# Patient Record
Sex: FEMALE | Race: WHITE | URBAN - METROPOLITAN AREA
[De-identification: names, ages, dates, MRNs, and addresses within clinical notes are randomized per-mention and may not be internally consistent; named-entity substitution may affect disease eponyms.]

---

## 2018-01-26 ENCOUNTER — EMERGENCY (EMERGENCY)
Facility: HOSPITAL | Age: 28
LOS: 1 days | End: 2018-01-26
Attending: INTERNAL MEDICINE
Payer: COMMERCIAL

## 2018-01-26 VITALS
HEIGHT: 69 IN | OXYGEN SATURATION: 97 % | TEMPERATURE: 98 F | SYSTOLIC BLOOD PRESSURE: 112 MMHG | RESPIRATION RATE: 16 BRPM | HEART RATE: 103 BPM | WEIGHT: 214.95 LBS | DIASTOLIC BLOOD PRESSURE: 65 MMHG

## 2018-01-26 PROCEDURE — 99284 EMERGENCY DEPT VISIT MOD MDM: CPT | Mod: 25

## 2018-01-26 PROCEDURE — 99285 EMERGENCY DEPT VISIT HI MDM: CPT

## 2018-01-26 PROCEDURE — 70450 CT HEAD/BRAIN W/O DYE: CPT

## 2018-01-26 NOTE — ED ADULT NURSE NOTE - OBJECTIVE STATEMENT
Present to ER with c/o of head injury. Pt states she trip and fell this morning about 9am. Pt now c/o of nausea. Denies any numbness or tingling

## 2018-01-26 NOTE — ED ADULT TRIAGE NOTE - PRO INTERPRETER NEED 2
English Attending Only I have personally seen and examined this patient. I have fully participated in the care of this patient. I have reviewed all pertinent clinical information, including history physical exam, plan and the Resident's note and agree except as noted

## 2018-01-27 PROCEDURE — 70450 CT HEAD/BRAIN W/O DYE: CPT | Mod: 26

## 2018-08-23 NOTE — ED ADULT NURSE NOTE - CCCP TRG CHIEF CMPLNT
Subjective:       Patient ID: Ashli Kumar is a 62 y.o. female.    Chief Complaint: Follow-up    HPI  Contacted us last month to request refills of omeprazole and bentyl, though today says she does not take these.  She is currently using ranitidine BID.  She has episodic chest discomfort and burning with certain foods.  She has had negative cardiology evaluation recently and heartburn as cause of chest discomfort suggested.  She denies dysphagia, thought reports discomfort with swallowing very cold liquids.   Episodic RUQ pain that is relieved spontaneously.    No nausea or vomiting.  Denies abdominal pain.   Reports regular bowel habit with rare abdominal cramping and loose stools. No blood with bowel movements or black stools.   EGD and colonoscopy 5/2017:  EGD:  - Normal duodenal bulb and second portion of the                         duodenum.                        - Non-bleeding gastric ulcers with pigmented                         material. Biopsied.                        - The examination was otherwise normal.                        - Z-line regular, 40 cm from the incisors.                        - Normal esophagus.     Colonoscopy:    The perianal and digital rectal examinations were normal.       Multiple medium-mouthed diverticula were found in the sigmoid colon.       Due to tortuosity and angulation in an area of numerouds        diverticula, the scope could not be advanced above the 45 cm level       The rectum appeared normal.    Limited BE was normal    Pathology:  Stomach, antrum ulcer, biopsy:  Chronic gastritis.  No definitive area of ulceration is identified within the entirely submitted biopsy specimen.  Immunohistochemical stain for Helicobacter species will be performed and results will follow in a supplemental  Report.  Negative for h.pylori        Review of Systems   Constitutional: Negative.  Negative for activity change, appetite change, fatigue, fever and unexpected weight  change.   HENT: Negative for trouble swallowing.    Respiratory: Positive for chest tightness. Negative for cough, choking and shortness of breath.    Cardiovascular: Negative for chest pain.   Gastrointestinal: Positive for abdominal pain (rare abdominal cramping) and diarrhea (rare, with certain foods). Negative for abdominal distention, blood in stool, constipation, nausea and vomiting.   Musculoskeletal: Negative.    Skin: Negative.    Psychiatric/Behavioral: Negative.        Objective:      Physical Exam   Constitutional: She is oriented to person, place, and time. She appears well-developed and well-nourished. No distress.   Eyes: No scleral icterus.   Cardiovascular: Normal rate.   Pulmonary/Chest: Effort normal.   Abdominal: Soft. Bowel sounds are normal.   Neurological: She is alert and oriented to person, place, and time.   Skin: Skin is warm and dry. She is not diaphoretic.   Psychiatric: She has a normal mood and affect. Her behavior is normal. Judgment and thought content normal.   Vitals reviewed.      Assessment:       1. Heartburn    2. Esophageal spasm        Plan:     Ashli Antonio was seen today for follow-up.    Diagnoses and all orders for this visit:    Heartburn    Esophageal spasm         Discussed reflux prevention.  Avoidance of cold foods and drinks that may be causing esophageal spasm.  Continue ranitidine.    Will follow up in one month and if esophageal complaints have continued, can consider esophagram.       pain, head

## 2023-10-18 NOTE — ED PROVIDER NOTE - CONSTITUTIONAL, MLM
normal... Well appearing, well nourished, awake, alert, oriented to person, place, time/situation and in mild  distress. Wound Care: Petrolatum